# Patient Record
Sex: FEMALE | Race: BLACK OR AFRICAN AMERICAN | URBAN - METROPOLITAN AREA
[De-identification: names, ages, dates, MRNs, and addresses within clinical notes are randomized per-mention and may not be internally consistent; named-entity substitution may affect disease eponyms.]

---
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
## 2023-12-22 PROBLEM — G80.0 SPASTIC QUADRIPLEGIC CEREBRAL PALSY (HCC): Status: ACTIVE | Noted: 2023-12-20

## 2023-12-22 PROBLEM — K56.2 VOLVULUS OF INTESTINE (HCC): Status: ACTIVE | Noted: 2023-11-04

## 2023-12-22 PROBLEM — Q04.2 HOLOPROSENCEPHALY (HCC): Status: ACTIVE | Noted: 2023-11-20

## 2023-12-22 PROBLEM — K56.2 VOLVULUS OF INTESTINE (HCC): Status: RESOLVED | Noted: 2023-11-04 | Resolved: 2023-12-22

## 2024-07-16 ENCOUNTER — TELEPHONE (OUTPATIENT)
Dept: PRIMARY CARE CLINIC | Age: 24
End: 2024-07-16

## 2024-07-16 NOTE — TELEPHONE ENCOUNTER
Dr Pineda from [facility] called in to update on this patient. Her left foot was stuck in the wheelchair and she has a left femoral fracture and will be in surgery today then stay in the hospital a couple days for recovery and pain control.